# Patient Record
Sex: FEMALE | Race: WHITE | Employment: UNEMPLOYED | URBAN - NONMETROPOLITAN AREA
[De-identification: names, ages, dates, MRNs, and addresses within clinical notes are randomized per-mention and may not be internally consistent; named-entity substitution may affect disease eponyms.]

---

## 2018-10-08 ENCOUNTER — APPOINTMENT (OUTPATIENT)
Dept: PICC SERVICES | Facility: HOSPITAL | Age: 48
End: 2018-10-08
Attending: EMERGENCY MEDICINE
Payer: MEDICAID

## 2018-10-08 ENCOUNTER — HOSPITAL ENCOUNTER (EMERGENCY)
Facility: HOSPITAL | Age: 48
Discharge: HOME | End: 2018-10-08
Attending: EMERGENCY MEDICINE
Payer: MEDICAID

## 2018-10-08 ENCOUNTER — APPOINTMENT (OUTPATIENT)
Dept: GENERAL RADIOLOGY | Facility: HOSPITAL | Age: 48
End: 2018-10-08
Attending: EMERGENCY MEDICINE
Payer: MEDICAID

## 2018-10-08 ENCOUNTER — HOSPITAL ENCOUNTER (EMERGENCY)
Facility: HOSPITAL | Age: 48
Discharge: LEFT BEFORE DISCHARGE | End: 2018-10-08
Payer: COMMERCIAL

## 2018-10-08 VITALS
RESPIRATION RATE: 18 BRPM | HEART RATE: 92 BPM | OXYGEN SATURATION: 97 % | TEMPERATURE: 99 F | DIASTOLIC BLOOD PRESSURE: 87 MMHG | SYSTOLIC BLOOD PRESSURE: 124 MMHG

## 2018-10-08 VITALS
DIASTOLIC BLOOD PRESSURE: 99 MMHG | SYSTOLIC BLOOD PRESSURE: 166 MMHG | OXYGEN SATURATION: 100 % | RESPIRATION RATE: 18 BRPM | HEART RATE: 79 BPM | TEMPERATURE: 98 F | WEIGHT: 185 LBS

## 2018-10-08 DIAGNOSIS — Z45.2 NEEDS PERIPHERALLY INSERTED CENTRAL CATHETER (PICC): Primary | ICD-10-CM

## 2018-10-08 PROCEDURE — 71045 X-RAY EXAM CHEST 1 VIEW: CPT | Performed by: EMERGENCY MEDICINE

## 2018-10-08 PROCEDURE — 36415 COLL VENOUS BLD VENIPUNCTURE: CPT

## 2018-10-08 PROCEDURE — 36569 INSJ PICC 5 YR+ W/O IMAGING: CPT

## 2018-10-08 PROCEDURE — 99284 EMERGENCY DEPT VISIT MOD MDM: CPT

## 2018-10-08 PROCEDURE — 85025 COMPLETE CBC W/AUTO DIFF WBC: CPT | Performed by: EMERGENCY MEDICINE

## 2018-10-08 PROCEDURE — 76937 US GUIDE VASCULAR ACCESS: CPT

## 2018-10-08 RX ORDER — SODIUM CHLORIDE 0.9 % (FLUSH) 0.9 %
10 SYRINGE (ML) INJECTION AS NEEDED
Status: DISCONTINUED | OUTPATIENT
Start: 2018-10-08 | End: 2018-10-08

## 2018-10-08 RX ORDER — LIDOCAINE HYDROCHLORIDE 10 MG/ML
0.5 INJECTION, SOLUTION INFILTRATION; PERINEURAL ONCE AS NEEDED
Status: DISCONTINUED | OUTPATIENT
Start: 2018-10-08 | End: 2018-10-08

## 2018-10-08 RX ORDER — ACETAMINOPHEN 500 MG
1000 TABLET ORAL ONCE
Status: COMPLETED | OUTPATIENT
Start: 2018-10-08 | End: 2018-10-08

## 2018-10-08 NOTE — ED INITIAL ASSESSMENT (HPI)
Patient comes with concern that her PICC line has blood in the line. The patient receives home care to maintenance and instruction for self-use of the PICC line. Positive blood return was discarded by RN and flushed with 10 mL of NS.  The patient now wants

## 2018-10-08 NOTE — CM/SW NOTE
Patient's friend brining the box of medicine and supplies from hotel room for return demonstration/teaching.

## 2018-10-08 NOTE — CM/SW NOTE
Spoke with Dr. Daniel Olguin he is aware of PICC re-insertion and [facility] will call office in the morning to possibly change the infusion order to remove the heparin flush as the single lumen has a valve.       Spoke with [facility] and they will call offi

## 2018-10-08 NOTE — CM/SW NOTE
Spoke with PICC line RN she states she will be placing a single lumen PICC- she asked this [facility] contact ordering MD to make them aware of change in PICC.  Patient is unsure of the MD who was the ordering MD. She states, \"it was an internist at [employer]

## 2018-10-08 NOTE — ED INITIAL ASSESSMENT (HPI)
Pt here with c/o \"backed up\" PICC line. Pt had PICC placed at home for antibiotic infusion and was here last night as well as today for blood backed up in the line.

## 2018-10-08 NOTE — CM/SW NOTE
Dr. Dilshad Singh is requesting discharge instructions, H&P and progress notes to be faxed to [phone].

## 2018-10-08 NOTE — ED NOTES
Patient advised to return for MD evaluation if there are any further issues, new symptoms or concerns.

## 2018-10-09 ENCOUNTER — HOSPITAL ENCOUNTER (EMERGENCY)
Facility: HOSPITAL | Age: 48
Discharge: STILL ADMITTED OR ADMINISTRATIVE | End: 2018-10-10
Payer: COMMERCIAL

## 2018-10-09 NOTE — ED NOTES
Teaching redone by this RN for picc line. written directions provided. Pt states understanding. Pt ambulatory to exit with steady gait.

## 2018-10-09 NOTE — ED PROVIDER NOTES
Patient Seen in: [facility] AND [facility] Emergency Department    History   Patient presents with:  Abnormal Result (metabolic, cardiac)    Stated Complaint: picc line issue    HPI    65-year-old male out of [address] lives on the [facility] but wor clean and dry and intact without signs of infection or swelling. There is a strong radial pulse. Neurological: Alert and oriented to person, place, and time. Skin: Skin is warm and dry. Psychiatric: Normal mood and affect.   Behavior is normal.   Nurs time and she elected to have another PICC line placed which was done here under ultrasound guidance. The emergency department  was involved and spoke at length with the infectious disease doctor treating her and the home health care provider.

## 2018-10-09 NOTE — CM/SW NOTE
Power Picc solo placed by PICC RN. LOT: [id number] Patient education done with meropenem brought from home with tubing also from home. Return demonstration done. IV ABX infusing.

## 2018-10-09 NOTE — ED NOTES
PICC line teaching performed by PICC nurse Jam Shin, . Dr Juju Kay aware PICC was properly placed and is working.  Pt understands dispo

## 2018-10-09 NOTE — ED NOTES
Took over care of pt from [place], rn. Pt finishing abx then will be DCed. Education given by case management on picc line.

## 2018-11-30 ENCOUNTER — HOSPITAL (OUTPATIENT)
Dept: OTHER | Age: 48
End: 2018-11-30

## 2019-03-21 ENCOUNTER — HOSPITAL (OUTPATIENT)
Dept: OTHER | Age: 49
End: 2019-03-21

## 2022-06-04 ENCOUNTER — TELEPHONE ENCOUNTER (OUTPATIENT)
Dept: URBAN - METROPOLITAN AREA CLINIC 68 | Facility: CLINIC | Age: 52
End: 2022-06-04

## 2022-06-05 ENCOUNTER — TELEPHONE ENCOUNTER (OUTPATIENT)
Dept: URBAN - METROPOLITAN AREA CLINIC 68 | Facility: CLINIC | Age: 52
End: 2022-06-05

## 2022-06-25 ENCOUNTER — TELEPHONE ENCOUNTER (OUTPATIENT)
Age: 52
End: 2022-06-25

## 2022-06-26 ENCOUNTER — TELEPHONE ENCOUNTER (OUTPATIENT)
Age: 52
End: 2022-06-26

## (undated) NOTE — ED AVS SNAPSHOT
Viviana Dubois   MRN: [id number]    Department:  [facility] Emergency Department   Date of Visit:  10/8/2018           Disclosure     Insurance plans vary and the physician(s) referred by the ER may not be covered by your plan.  Please contact you CARE PHYSICIAN AT ONCE OR RETURN IMMEDIATELY TO THE EMERGENCY DEPARTMENT. If you have been prescribed any medication(s), please fill your prescription right away and begin taking the medication(s) as directed.   If you believe that any of the medications